# Patient Record
Sex: FEMALE | Race: WHITE | NOT HISPANIC OR LATINO | Employment: STUDENT | URBAN - METROPOLITAN AREA
[De-identification: names, ages, dates, MRNs, and addresses within clinical notes are randomized per-mention and may not be internally consistent; named-entity substitution may affect disease eponyms.]

---

## 2024-05-03 ENCOUNTER — OFFICE VISIT (OUTPATIENT)
Dept: URGENT CARE | Facility: URGENT CARE | Age: 19
End: 2024-05-03
Payer: COMMERCIAL

## 2024-05-03 VITALS
TEMPERATURE: 98.6 F | HEART RATE: 97 BPM | RESPIRATION RATE: 16 BRPM | SYSTOLIC BLOOD PRESSURE: 95 MMHG | DIASTOLIC BLOOD PRESSURE: 67 MMHG | OXYGEN SATURATION: 98 % | WEIGHT: 115.6 LBS

## 2024-05-03 DIAGNOSIS — Z86.19 HISTORY OF CANDIDAL VULVOVAGINITIS: ICD-10-CM

## 2024-05-03 DIAGNOSIS — J02.0 STREPTOCOCCAL PHARYNGITIS: Primary | ICD-10-CM

## 2024-05-03 DIAGNOSIS — R07.0 THROAT PAIN: ICD-10-CM

## 2024-05-03 LAB — DEPRECATED S PYO AG THROAT QL EIA: POSITIVE

## 2024-05-03 PROCEDURE — 87880 STREP A ASSAY W/OPTIC: CPT

## 2024-05-03 PROCEDURE — 99203 OFFICE O/P NEW LOW 30 MIN: CPT | Performed by: NURSE PRACTITIONER

## 2024-05-03 RX ORDER — AMOXICILLIN 500 MG/1
500 CAPSULE ORAL 2 TIMES DAILY
Qty: 20 CAPSULE | Refills: 0 | Status: SHIPPED | OUTPATIENT
Start: 2024-05-03 | End: 2024-05-13

## 2024-05-03 RX ORDER — FLUCONAZOLE 150 MG/1
150 TABLET ORAL ONCE
Qty: 1 TABLET | Refills: 0 | Status: SHIPPED | OUTPATIENT
Start: 2024-05-03 | End: 2024-05-03

## 2024-05-03 NOTE — PATIENT INSTRUCTIONS
New toothbrush after you are on the antibiotics for 24 hours.    Streptococcus bacteria (strep throat) causes a severe sore throat, rash, fevers, swollen glands and an upset stomach.  If not treated appropriately/completely, it can damage the valves in the heart and the kidneys.  Take all medications until completely gone even though you may be feeling better before your doses end. This helps to prevent bacteria from becoming resistant to antibiotics and prevents super bugs from developing.    The body will fight this infection but it needs to be treated well in order to help heal itself.  Rest as needed.  It is ok to reduce food intake if appetite is poor but it is quite important to maintain/increase fluid intake.    For pain and fevers, acetaminophen (Tylenol) is most appropriate.  Ibuprofen (Advil) or naproxen (Aleve) are useful too and last longer but they can cause elevation of blood pressure or stomach problems.    A warm, salt water gargle will help soothe the throat.  This can be made with 1/4 tsp of salt per 8 oz of water).    If the symptoms get worse or last longer than a week, you should contact the clinic.

## 2024-05-03 NOTE — PROGRESS NOTES
SUBJECTIVE: 18 year old female with sore throat, myalgias, swollen glands, headache and fever for 2 days. No history of rheumatic fever.    OBJECTIVE:     BP 95/67 (BP Location: Left arm, Patient Position: Sitting, Cuff Size: Adult Regular)   Pulse 97   Temp 98.6  F (37  C) (Tympanic)   Resp 16   Wt 52.4 kg (115 lb 9.6 oz)   LMP  (LMP Unknown)   SpO2 98%   Breastfeeding No     Appears alert and mild distress.  Ears: normal  Oropharynx: tonsillar hypertrophy and marked erythema  Neck: supple and few small anterior cervical nodes  Lungs: chest clear to IPPA and clear to IPPA  Rapid Strep test is positive    ASSESSMENT: Streptococcal pharyngitis    PLAN: Amoxicillin Per orders. Gargle, use acetaminophen or other OTC analgesic, and take Rx fully as prescribed. Follow up as needed.     CANDIS Stewart, CNP  [facility]

## 2024-05-16 ENCOUNTER — NURSE TRIAGE (OUTPATIENT)
Dept: URGENT CARE | Facility: URGENT CARE | Age: 19
End: 2024-05-16
Payer: COMMERCIAL

## 2024-05-16 NOTE — TELEPHONE ENCOUNTER
Patient states that her throat is still really swollen. States that tonsils still feel big. Does not hurt to swallow and throat is improved. States that she feels a lot of mucus when she swallows.    Patient had to abruptly end the call. Writer unable to give home advise.    Advised follow up visit per urgent care note 2 weeks from visit if some symptoms are persisting.     Reason for Disposition   [1] Taking antibiotic for strep throat AND [2] symptoms are BETTER AND [3] no fever    Additional Information   Negative: SEVERE difficulty breathing (e.g., struggling for each breath, speaks in single words, stridor)   Negative: Sounds like a life-threatening emergency to the triager   Negative: [1] Drooling or spitting out saliva (because can't swallow) AND [2] new-onset   Negative: Unable to open mouth completely   Negative: [1] Difficulty breathing AND [2] not severe   Negative: [1] Fever > 103 F (39.4 C) AND [2] taking antibiotic > 24 hours   Negative: [1] Drinking very little AND [2] dehydration suspected (e.g., no urine > 12 hours, very dry mouth, very lightheaded)   Negative: [1] Refuses to drink anything AND [2] for > 12 hours   Negative: Patient sounds very sick or weak to the triager   Negative: [1] Taking antibiotic > 24 hours for strep throat AND [2] sore throat pain is SEVERE   Negative: [1] Taking antibiotic > 48 hours (2 days) for strep throat AND [2] fever persists   Negative: [1] Taking antibiotic > 72 hours (3 days) for strep throat AND [2] sore throat not improved   Negative: [1] Taking antibiotics < 48 hours for strep throat AND [2] fever persists   Negative: [1] Taking antibiotic < 72 hours (3 days) for strep throat AND [2] sore throat not improved    Protocols used: Strep Throat Infection on Antibiotic Follow-up Call-A-ALANNA De Paz RN     faxed

## 2024-05-17 ENCOUNTER — NURSE TRIAGE (OUTPATIENT)
Dept: FAMILY MEDICINE | Facility: CLINIC | Age: 19
End: 2024-05-17
Payer: COMMERCIAL

## 2024-05-17 ENCOUNTER — OFFICE VISIT (OUTPATIENT)
Dept: URGENT CARE | Facility: URGENT CARE | Age: 19
End: 2024-05-17
Payer: COMMERCIAL

## 2024-05-17 VITALS
TEMPERATURE: 98.3 F | RESPIRATION RATE: 16 BRPM | SYSTOLIC BLOOD PRESSURE: 103 MMHG | DIASTOLIC BLOOD PRESSURE: 70 MMHG | OXYGEN SATURATION: 98 % | HEART RATE: 91 BPM | WEIGHT: 118.1 LBS

## 2024-05-17 DIAGNOSIS — J02.0 STREPTOCOCCAL PHARYNGITIS: ICD-10-CM

## 2024-05-17 DIAGNOSIS — R07.0 THROAT PAIN: Primary | ICD-10-CM

## 2024-05-17 LAB
BASOPHILS # BLD AUTO: 0 10E3/UL (ref 0–0.2)
BASOPHILS NFR BLD AUTO: 0 %
EOSINOPHIL # BLD AUTO: 0 10E3/UL (ref 0–0.7)
EOSINOPHIL NFR BLD AUTO: 0 %
ERYTHROCYTE [DISTWIDTH] IN BLOOD BY AUTOMATED COUNT: 12.3 % (ref 10–15)
HCT VFR BLD AUTO: 42.2 % (ref 35–47)
HGB BLD-MCNC: 13.2 G/DL (ref 11.7–15.7)
IMM GRANULOCYTES # BLD: 0 10E3/UL
IMM GRANULOCYTES NFR BLD: 0 %
LYMPHOCYTES # BLD AUTO: 1.5 10E3/UL (ref 0.8–5.3)
LYMPHOCYTES NFR BLD AUTO: 16 %
MCH RBC QN AUTO: 28.8 PG (ref 26.5–33)
MCHC RBC AUTO-ENTMCNC: 31.3 G/DL (ref 31.5–36.5)
MCV RBC AUTO: 92 FL (ref 78–100)
MONOCYTES # BLD AUTO: 0.6 10E3/UL (ref 0–1.3)
MONOCYTES NFR BLD AUTO: 6 %
MONOCYTES NFR BLD AUTO: NEGATIVE %
NEUTROPHILS # BLD AUTO: 7.2 10E3/UL (ref 1.6–8.3)
NEUTROPHILS NFR BLD AUTO: 77 %
PLATELET # BLD AUTO: 212 10E3/UL (ref 150–450)
RBC # BLD AUTO: 4.58 10E6/UL (ref 3.8–5.2)
WBC # BLD AUTO: 9.3 10E3/UL (ref 4–11)

## 2024-05-17 PROCEDURE — 85025 COMPLETE CBC W/AUTO DIFF WBC: CPT | Performed by: FAMILY MEDICINE

## 2024-05-17 PROCEDURE — 99213 OFFICE O/P EST LOW 20 MIN: CPT | Performed by: FAMILY MEDICINE

## 2024-05-17 PROCEDURE — 36415 COLL VENOUS BLD VENIPUNCTURE: CPT | Performed by: FAMILY MEDICINE

## 2024-05-17 PROCEDURE — 86308 HETEROPHILE ANTIBODY SCREEN: CPT | Performed by: FAMILY MEDICINE

## 2024-05-17 NOTE — TELEPHONE ENCOUNTER
Pt called c/o persistent sore throat     Completed abx after 5/3/24  visit for strep     Was starting to feel better on abx but now feeling worse than she was during UC visit     Swelling in back of throat     Thinks maybe has white spots     Painful with swallowing     Yesterday/today is much worse again     Per protocol advised visit today. Nothing available in clinic. Pt has no PCP. Recommended returning to [facility] today. Pt verbalized agreement and understanding     Rochelle SALGUERO Triage RN  [facility]       Reason for Disposition   Patient wants to be seen    Additional Information   Negative: Sore throat symptoms are much worse   Negative: Patient sounds very sick or weak to the triager    Protocols used: Strep Throat Test Follow-Up Call-A-[place]

## 2024-05-17 NOTE — PROGRESS NOTES
SUBJECTIVE: 18 year old female with:  Chief Complaint   Patient presents with    Urgent Care     Pt states she was seen on 05/03, and tested positive for strep, was given antibiotics but symptoms are not getting better.     ST, was treated for strep, got better and then?    Duration: >14 days.  Severity: mild/moderate  Modifiers: OTC meds not helpful  Trend of symptoms: worsening  Other symptoms:    No Nausea/vomiting. No rash.  HEENT, CV, Resp, GI, Skin, review of symptoms except as noted above is otherwise negative  Allergies: Patient has no known allergies.  I have reviewed the patient's medical history in detail; there are no changes to the history as noted in [facility].    OBJECTIVE:  /70   Pulse 91   Temp 98.3  F (36.8  C) (Tympanic)   Resp 16   Wt 53.6 kg (118 lb 1.6 oz)   LMP  (LMP Unknown)   SpO2 98%   Appears mild distress.  Eyes: no conjunctivitis  Ears: normal bilateral TM's and external ear canals, normal  Oropharynx: erythema, no palate petechia  Neck: ++ adenopathy  Skin: no rash        Results for orders placed or performed in visit on 05/17/24   Mononucleosis screen     Status: Normal   Result Value Ref Range    Mononucleosis Screen Negative Negative   CBC with platelets and differential     Status: Abnormal   Result Value Ref Range    WBC Count 9.3 4.0 - 11.0 10e3/uL    RBC Count 4.58 3.80 - 5.20 10e6/uL    Hemoglobin 13.2 11.7 - 15.7 g/dL    Hematocrit 42.2 35.0 - 47.0 %    MCV 92 78 - 100 fL    MCH 28.8 26.5 - 33.0 pg    MCHC 31.3 (L) 31.5 - 36.5 g/dL    RDW 12.3 10.0 - 15.0 %    Platelet Count 212 150 - 450 10e3/uL    % Neutrophils 77 %    % Lymphocytes 16 %    % Monocytes 6 %    % Eosinophils 0 %    % Basophils 0 %    % Immature Granulocytes 0 %    Absolute Neutrophils 7.2 1.6 - 8.3 10e3/uL    Absolute Lymphocytes 1.5 0.8 - 5.3 10e3/uL    Absolute Monocytes 0.6 0.0 - 1.3 10e3/uL    Absolute Eosinophils 0.0 0.0 - 0.7 10e3/uL    Absolute Basophils 0.0 0.0 - 0.2 10e3/uL    Absolute  Immature Granulocytes 0.0 <=0.4 10e3/uL   CBC with platelets and differential     Status: Abnormal    Narrative    The following orders were created for panel order CBC with platelets and differential.  Procedure                               Abnormality         Status                     ---------                               -----------         ------                     CBC with platelets and d...[[id number]]  Abnormal            Final result                 Please view results for these tests on the individual orders.           ASSESSMENT/PLAN:    ICD-10-CM    1. Throat pain  R07.0 Mononucleosis screen     CBC with platelets and differential     Mononucleosis screen     CBC with platelets and differential      2. Streptococcal pharyngitis  J02.0 amoxicillin-clavulanate (AUGMENTIN) 875-125 MG tablet        Failed strep Tx, checked for mono, negative    Try augmentin    Gargle, use acetaminophen or other OTC analgesic. Call if other family members develop similar symptoms. See prn.  Mohsen Min MD M[facility]